# Patient Record
Sex: FEMALE | Race: WHITE | NOT HISPANIC OR LATINO | Employment: OTHER | URBAN - METROPOLITAN AREA
[De-identification: names, ages, dates, MRNs, and addresses within clinical notes are randomized per-mention and may not be internally consistent; named-entity substitution may affect disease eponyms.]

---

## 2017-02-02 RX ORDER — TIZANIDINE 4 MG/1
TABLET ORAL
Qty: 90 TABLET | Refills: 3 | Status: SHIPPED | OUTPATIENT
Start: 2017-02-02 | End: 2018-05-04 | Stop reason: ALTCHOICE

## 2017-02-14 ENCOUNTER — HOSPITAL ENCOUNTER (EMERGENCY)
Facility: HOSPITAL | Age: 67
Discharge: HOME | End: 2017-02-14
Attending: EMERGENCY MEDICINE
Payer: MEDICARE

## 2017-02-14 VITALS
WEIGHT: 140 LBS | HEIGHT: 65 IN | BODY MASS INDEX: 23.32 KG/M2 | DIASTOLIC BLOOD PRESSURE: 82 MMHG | HEART RATE: 95 BPM | SYSTOLIC BLOOD PRESSURE: 150 MMHG | OXYGEN SATURATION: 99 % | RESPIRATION RATE: 14 BRPM | TEMPERATURE: 96 F

## 2017-02-14 DIAGNOSIS — F19.20: ICD-10-CM

## 2017-02-14 DIAGNOSIS — R55 NEAR SYNCOPE: Primary | ICD-10-CM

## 2017-02-14 LAB
ALBUMIN SERPL BCP-MCNC: 3.9 G/DL
ALP SERPL-CCNC: 88 U/L
ALT SERPL W/O P-5'-P-CCNC: 27 U/L
ANION GAP SERPL CALC-SCNC: 15 MMOL/L
AST SERPL-CCNC: 31 U/L
BASOPHILS # BLD AUTO: 0.04 K/UL
BASOPHILS NFR BLD: 0.6 %
BILIRUB SERPL-MCNC: 0.8 MG/DL
BUN SERPL-MCNC: 15 MG/DL
CALCIUM SERPL-MCNC: 9.7 MG/DL
CHLORIDE SERPL-SCNC: 100 MMOL/L
CO2 SERPL-SCNC: 22 MMOL/L
CREAT SERPL-MCNC: 0.8 MG/DL
DIFFERENTIAL METHOD: ABNORMAL
EOSINOPHIL # BLD AUTO: 0.1 K/UL
EOSINOPHIL NFR BLD: 1.3 %
ERYTHROCYTE [DISTWIDTH] IN BLOOD BY AUTOMATED COUNT: 14.7 %
EST. GFR  (AFRICAN AMERICAN): >60 ML/MIN/1.73 M^2
EST. GFR  (NON AFRICAN AMERICAN): >60 ML/MIN/1.73 M^2
GLUCOSE SERPL-MCNC: 84 MG/DL
HCT VFR BLD AUTO: 33.8 %
HGB BLD-MCNC: 11 G/DL
LYMPHOCYTES # BLD AUTO: 1.9 K/UL
LYMPHOCYTES NFR BLD: 30.1 %
MCH RBC QN AUTO: 28.9 PG
MCHC RBC AUTO-ENTMCNC: 32.5 %
MCV RBC AUTO: 89 FL
MONOCYTES # BLD AUTO: 0.8 K/UL
MONOCYTES NFR BLD: 13.2 %
NEUTROPHILS # BLD AUTO: 3.5 K/UL
NEUTROPHILS NFR BLD: 54.6 %
PLATELET # BLD AUTO: 213 K/UL
PMV BLD AUTO: 10.7 FL
POTASSIUM SERPL-SCNC: 4.1 MMOL/L
PROT SERPL-MCNC: 6.6 G/DL
RBC # BLD AUTO: 3.8 M/UL
SODIUM SERPL-SCNC: 137 MMOL/L
WBC # BLD AUTO: 6.35 K/UL

## 2017-02-14 PROCEDURE — 93005 ELECTROCARDIOGRAM TRACING: CPT

## 2017-02-14 PROCEDURE — 25000003 PHARM REV CODE 250: Performed by: EMERGENCY MEDICINE

## 2017-02-14 PROCEDURE — 99284 EMERGENCY DEPT VISIT MOD MDM: CPT | Mod: 25

## 2017-02-14 PROCEDURE — 96360 HYDRATION IV INFUSION INIT: CPT

## 2017-02-14 PROCEDURE — 85025 COMPLETE CBC W/AUTO DIFF WBC: CPT

## 2017-02-14 PROCEDURE — 80053 COMPREHEN METABOLIC PANEL: CPT

## 2017-02-14 RX ADMIN — SODIUM CHLORIDE 500 ML: 0.9 INJECTION, SOLUTION INTRAVENOUS at 07:02

## 2017-02-14 NOTE — ED AVS SNAPSHOT
[facility] [address]  [place] [address]               Hillary [address]   2017  6:35 PM   ED    Description:  Female : 1950   Department:  [facility]           Your Care was Coordinated By:     Provider Role From To    Humza Romano MD Attending Provider 17 1421 --      Reason for Visit     Loss of Consciousness           Diagnoses this Visit        Comments    Near syncope    -  Primary     Polysubstance (excluding opioids) dependence w/o physiol dependence           ED Disposition     None           To Do List           Follow-up Information     Follow up with Sandy Small MD. Call in 1 day.    Specialty:  Internal Medicine    Why:  Call tomorrow to schedule follow-up appointment to make sure you doing better.  You should be seen within the next week.  Return to the emergency room for worsening symptoms    Contact information:     [facility]  [place] [address]  [phone]        [facility]     [facility] Care [facility] -  Assistance  Registered nurses in the [facility] provide clinical advisement, health education, appointment booking, and other advisory services.  Call for this free service at [phone].             Medications           Message regarding Medications     Verify the changes and/or additions to your medication regime listed below are the same as discussed with your clinician today.  If any of these changes or additions are incorrect, please notify your healthcare provider.        These medications were administered today        Dose Freq    sodium chloride 0.9% bolus 500 mL 500 mL ED 1 Time    Sig: Inject 500 mLs into the vein ED 1 Time.    Class: Normal    Route: Intravenous           Verify that the below list of medications is an accurate representation of the medications you are currently taking.  If none reported, the list may be blank. If incorrect, please  "contact your healthcare provider. Carry this list with you in case of emergency.           Current Medications     diazepam (VALIUM) 10 MG Tab     levothyroxine (SYNTHROID) 125 MCG tablet Take 125 mcg by mouth once daily.    tizanidine (ZANAFLEX) 4 MG tablet TAKE ONE TABLET BY MOUTH EVERY 8 HOURS IF NEEDED FOR MUSCLE SPASMS    zolpidem (AMBIEN) 10 mg Tab 5 mg.            Clinical Reference Information           Your Vitals Were     BP Pulse Temp Resp Height Weight    135/86 (Patient Position: Standing) 95 96.1 °F (35.6 °C) (Oral) 14 5' 5" (1.651 m) 63.5 kg (140 lb)    SpO2 BMI             99% 23.3 kg/m2         Allergies as of 2/14/2017     No Known Allergies      Immunizations Administered on Date of Encounter - 2/14/2017     None      ED Micro, Lab, POCT     Start Ordered       Status Ordering Provider    02/14/17 1913 02/14/17 1913  CBC auto differential  STAT      Final result     02/14/17 1913 02/14/17 1913  Comprehensive metabolic panel  STAT      Final result       ED Imaging Orders     None      Discharge References/Attachments     PRESCRIPTION MEDICINES, SIGNS OF ADDICTION TO (ENGLISH)    NEAR SYNCOPE, UNKNOWN (ENGLISH)    HYPOTENSION, ALL CAUSES (ENGLISH)    LOW BLOOD PRESSURE (HYPOTENSION), DISCHARGE INSTRUCTIONS (ENGLISH)      [facility] Sign-Up     Activating your [facility] account is as easy as 1-2-3!     1) Visit [email or web address], select Sign Up Now, enter this activation code and your date of birth, then select Next.  [email or web address]  Expires: 3/31/2017  8:35 PM      2) Create a username and password to use when you visit [facility] in the future and select a security question in case you lose your password and select Next.    3) Enter your e-mail address and click Sign Up!    Additional Information  If you have questions, please e-mail [email or web address] or call [phone] to talk to our [facility] staff. Remember, [facility] is NOT to be used for urgent needs. For medical emergencies, dial 911.    "       [facility] complies with applicable Federal civil rights laws and does not discriminate on the basis of race, color, national origin, age, disability, or sex.        Language Assistance Services     ATTENTION: Language assistance services are available, free of charge. Please call [phone].      ATENCIÓN: Si habla español, tiene a joaquin disposición servicios gratuitos de asistencia lingüística. Llame al [phone].     CHÚ Ý: N?u b?n nói Ti?ng Vi?t, có các d?ch v? h? tr? ngôn ng? mi?n phí dành cho b?n. G?i s? [phone].

## 2017-02-15 NOTE — ED NOTES
Pt reports LOC today while sitting on toilet, pt denies chest pain or sob, denies dizziness denies head trauma, pt awake alert in no acute distress, pt reports not sleeping for the past 3 nights,

## 2017-02-15 NOTE — ED PROVIDER NOTES
Encounter Date: 2/14/2017       History     Chief Complaint   Patient presents with    Loss of Consciousness     accomapnied by hypotension; EMS reports patients bp was in the 70's on scene; states was sitting on toilet when loc occurred; denies chest pain, shortness of breath or injury;      Review of patient's allergies indicates:  No Known Allergies  HPI Comments: Issue is a 66-year-old female with past medical history of depression, Raynaud phenomenon, hypothyroidism, prior ER visit in August of last year for hypertension who presents to emergency room for evaluation of an episode of hypotension while she was on the toilet using the bathroom.  She states she fell asleep and then her  started knocking on the door with EMS who noted her to have a systolic blood pressure 70s.  The patient denies any headache chest pain abdominal pain vomiting diarrhea dysuria fevers headache palpitations or recent infections or upper respiratory symptoms.  The patient does take several different medications to deal with her stress including tizanidine, Valium, zolpidem all of which she took today.  She usually takes a regular basis except for the tizanidine which she decided to take today because her  has been significantly stressing her out with his Alzheimer's.  Patient states her  stresses her out and she frequently loses sleep and doesn't need as much as she should.  She also had a glass of wine yesterday    Past Medical History   Diagnosis Date    Depression     History of venomous spider bite 2010    Hypertension      diet controlled    Hypothyroidism     Other and unspecified hyperlipidemia      Diet-controlled    Raynaud phenomenon      No past medical history pertinent negatives.  Past Surgical History   Procedure Laterality Date    Thyroid surgery      Fracture left humerus   December 2012     with hardware     History reviewed. No pertinent family history.  Social History   Substance Use  Topics    Smoking status: Never Smoker    Smokeless tobacco: Never Used    Alcohol use No     Review of Systems   Constitutional: Negative for fever.   HENT: Negative for ear pain, rhinorrhea and sore throat.    Eyes: Negative for pain and visual disturbance.   Respiratory: Negative for cough, shortness of breath, wheezing and stridor.    Cardiovascular: Negative for chest pain, palpitations and leg swelling.   Gastrointestinal: Negative for abdominal pain, diarrhea, nausea and vomiting.   Endocrine: Negative for polyuria.   Genitourinary: Negative for decreased urine volume, difficulty urinating, dysuria, hematuria, urgency and vaginal discharge.   Musculoskeletal: Negative for arthralgias.   Skin: Negative for rash.   Neurological: Positive for syncope (patient states she did not lose consciousness but just fell asleep). Negative for weakness, numbness and headaches.   Psychiatric/Behavioral: Positive for sleep disturbance (Chronic insomnia). Negative for agitation, confusion, self-injury and suicidal ideas. The patient is nervous/anxious.    All other systems reviewed and are negative.      Physical Exam   Initial Vitals   BP Pulse Resp Temp SpO2   02/14/17 1835 02/14/17 1835 02/14/17 1835 02/14/17 1835 02/14/17 1835   95/50 80 14 96.1 °F (35.6 °C) 97 %     Physical Exam    Nursing note and vitals reviewed.  Constitutional: She appears well-developed and well-nourished. No distress.   HENT:   Head: Normocephalic and atraumatic.   Mouth/Throat: Oropharynx is clear and moist.   Eyes: Conjunctivae and EOM are normal. Pupils are equal, round, and reactive to light.   Neck: Neck supple.   Cardiovascular: Normal rate, regular rhythm, normal heart sounds and intact distal pulses. Exam reveals no gallop and no friction rub.    No murmur heard.  Pulmonary/Chest: Breath sounds normal. She has no wheezes. She has no rhonchi. She has no rales.   Abdominal: Soft. Bowel sounds are normal. There is no tenderness. There is no  rebound and no guarding.   Musculoskeletal: Normal range of motion.   Neurological: She is alert and oriented to person, place, and time. She has normal strength. No sensory deficit.   Skin: Skin is warm and dry.   Psychiatric: Her behavior is normal. Judgment and thought content normal.   Slightly anxious, but not depressed or suicidal.         ED Course   Procedures  Labs Reviewed   CBC W/ AUTO DIFFERENTIAL - Abnormal; Notable for the following:        Result Value    RBC 3.80 (*)     Hemoglobin 11.0 (*)     Hematocrit 33.8 (*)     RDW 14.7 (*)     All other components within normal limits   COMPREHENSIVE METABOLIC PANEL - Abnormal; Notable for the following:     CO2 22 (*)     All other components within normal limits                               ED Course     Clinical Impression:   The primary encounter diagnosis was Near syncope. A diagnosis of Polysubstance (excluding opioids) dependence w/o physiol dependence was also pertinent to this visit.          Humza Romano MD  02/14/17 2052

## 2017-02-19 ENCOUNTER — HOSPITAL ENCOUNTER (EMERGENCY)
Facility: HOSPITAL | Age: 67
Discharge: PSYCHIATRIC OR SUBSTANCE USE FACILITY | End: 2017-02-20
Attending: EMERGENCY MEDICINE
Payer: MEDICARE

## 2017-02-19 DIAGNOSIS — F22 PARANOID: ICD-10-CM

## 2017-02-19 DIAGNOSIS — F29 PSYCHOSIS, UNSPECIFIED PSYCHOSIS TYPE: ICD-10-CM

## 2017-02-19 DIAGNOSIS — F22 PARANOID DELUSION: Primary | ICD-10-CM

## 2017-02-19 LAB
ALBUMIN SERPL BCP-MCNC: 3.8 G/DL
ALP SERPL-CCNC: 97 U/L
ALT SERPL W/O P-5'-P-CCNC: 29 U/L
ANION GAP SERPL CALC-SCNC: 18 MMOL/L
APAP SERPL-MCNC: <3 UG/ML
AST SERPL-CCNC: 57 U/L
BASOPHILS # BLD AUTO: 0.04 K/UL
BASOPHILS NFR BLD: 0.6 %
BILIRUB SERPL-MCNC: 0.9 MG/DL
BUN SERPL-MCNC: 21 MG/DL
CALCIUM SERPL-MCNC: 9.5 MG/DL
CHLORIDE SERPL-SCNC: 96 MMOL/L
CO2 SERPL-SCNC: 24 MMOL/L
CREAT SERPL-MCNC: 1.1 MG/DL
DIFFERENTIAL METHOD: ABNORMAL
EOSINOPHIL # BLD AUTO: 0.1 K/UL
EOSINOPHIL NFR BLD: 0.9 %
ERYTHROCYTE [DISTWIDTH] IN BLOOD BY AUTOMATED COUNT: 14.7 %
EST. GFR  (AFRICAN AMERICAN): >60 ML/MIN/1.73 M^2
EST. GFR  (NON AFRICAN AMERICAN): 52 ML/MIN/1.73 M^2
ETHANOL SERPL-MCNC: <10 MG/DL
GLUCOSE SERPL-MCNC: 91 MG/DL
HCT VFR BLD AUTO: 37.9 %
HGB BLD-MCNC: 12.3 G/DL
LYMPHOCYTES # BLD AUTO: 1.8 K/UL
LYMPHOCYTES NFR BLD: 26.8 %
MCH RBC QN AUTO: 29.2 PG
MCHC RBC AUTO-ENTMCNC: 32.5 %
MCV RBC AUTO: 90 FL
MONOCYTES # BLD AUTO: 0.5 K/UL
MONOCYTES NFR BLD: 7.2 %
NEUTROPHILS # BLD AUTO: 4.2 K/UL
NEUTROPHILS NFR BLD: 64.3 %
PLATELET # BLD AUTO: 241 K/UL
PMV BLD AUTO: 11 FL
POTASSIUM SERPL-SCNC: 3.4 MMOL/L
PROT SERPL-MCNC: 6.5 G/DL
RBC # BLD AUTO: 4.21 M/UL
SALICYLATES SERPL-MCNC: <5 MG/DL
SODIUM SERPL-SCNC: 138 MMOL/L
TSH SERPL DL<=0.005 MIU/L-ACNC: 0.41 UIU/ML
WBC # BLD AUTO: 6.57 K/UL

## 2017-02-19 PROCEDURE — 63600175 PHARM REV CODE 636 W HCPCS: Performed by: EMERGENCY MEDICINE

## 2017-02-19 PROCEDURE — 99285 EMERGENCY DEPT VISIT HI MDM: CPT | Mod: 25

## 2017-02-19 PROCEDURE — 80053 COMPREHEN METABOLIC PANEL: CPT

## 2017-02-19 PROCEDURE — 80307 DRUG TEST PRSMV CHEM ANLYZR: CPT

## 2017-02-19 PROCEDURE — 84443 ASSAY THYROID STIM HORMONE: CPT

## 2017-02-19 PROCEDURE — 80329 ANALGESICS NON-OPIOID 1 OR 2: CPT

## 2017-02-19 PROCEDURE — 25000003 PHARM REV CODE 250: Performed by: EMERGENCY MEDICINE

## 2017-02-19 PROCEDURE — 96361 HYDRATE IV INFUSION ADD-ON: CPT

## 2017-02-19 PROCEDURE — 85025 COMPLETE CBC W/AUTO DIFF WBC: CPT

## 2017-02-19 PROCEDURE — 96374 THER/PROPH/DIAG INJ IV PUSH: CPT

## 2017-02-19 PROCEDURE — 80320 DRUG SCREEN QUANTALCOHOLS: CPT

## 2017-02-19 PROCEDURE — 96375 TX/PRO/DX INJ NEW DRUG ADDON: CPT

## 2017-02-19 RX ORDER — SODIUM CHLORIDE 9 MG/ML
1000 INJECTION, SOLUTION INTRAVENOUS
Status: COMPLETED | OUTPATIENT
Start: 2017-02-19 | End: 2017-02-19

## 2017-02-19 RX ORDER — HALOPERIDOL 5 MG/ML
5 INJECTION INTRAMUSCULAR
Status: COMPLETED | OUTPATIENT
Start: 2017-02-19 | End: 2017-02-19

## 2017-02-19 RX ORDER — LORAZEPAM 2 MG/ML
2 INJECTION INTRAMUSCULAR
Status: COMPLETED | OUTPATIENT
Start: 2017-02-19 | End: 2017-02-19

## 2017-02-19 RX ADMIN — SODIUM CHLORIDE 1000 ML: 0.9 INJECTION, SOLUTION INTRAVENOUS at 11:02

## 2017-02-19 RX ADMIN — HALOPERIDOL LACTATE 5 MG: 5 INJECTION, SOLUTION INTRAMUSCULAR at 11:02

## 2017-02-19 RX ADMIN — LORAZEPAM 2 MG: 2 INJECTION, SOLUTION INTRAMUSCULAR; INTRAVENOUS at 11:02

## 2017-02-20 VITALS
OXYGEN SATURATION: 99 % | BODY MASS INDEX: 24.66 KG/M2 | TEMPERATURE: 98 F | HEIGHT: 65 IN | RESPIRATION RATE: 18 BRPM | DIASTOLIC BLOOD PRESSURE: 68 MMHG | WEIGHT: 148 LBS | HEART RATE: 99 BPM | SYSTOLIC BLOOD PRESSURE: 120 MMHG

## 2017-02-20 LAB
AMPHET+METHAMPHET UR QL: NEGATIVE
BARBITURATES UR QL SCN>200 NG/ML: NEGATIVE
BENZODIAZ UR QL SCN>200 NG/ML: NORMAL
BILIRUB UR QL STRIP: NEGATIVE
BZE UR QL SCN: NEGATIVE
CANNABINOIDS UR QL SCN: NEGATIVE
CLARITY UR: CLEAR
COLOR UR: YELLOW
CREAT UR-MCNC: 56.1 MG/DL
GLUCOSE UR QL STRIP: NEGATIVE
HGB UR QL STRIP: NEGATIVE
KETONES UR QL STRIP: ABNORMAL
LEUKOCYTE ESTERASE UR QL STRIP: NEGATIVE
METHADONE UR QL SCN>300 NG/ML: NEGATIVE
NITRITE UR QL STRIP: NEGATIVE
OPIATES UR QL SCN: NEGATIVE
PCP UR QL SCN>25 NG/ML: NEGATIVE
PH UR STRIP: 6 [PH] (ref 5–8)
PROT UR QL STRIP: NEGATIVE
SP GR UR STRIP: <=1.005 (ref 1–1.03)
TOXICOLOGY INFORMATION: NORMAL
URN SPEC COLLECT METH UR: ABNORMAL
UROBILINOGEN UR STRIP-ACNC: NEGATIVE EU/DL

## 2017-02-20 PROCEDURE — 93010 ELECTROCARDIOGRAM REPORT: CPT | Mod: ,,, | Performed by: INTERNAL MEDICINE

## 2017-02-20 PROCEDURE — 93005 ELECTROCARDIOGRAM TRACING: CPT

## 2017-02-20 PROCEDURE — 81003 URINALYSIS AUTO W/O SCOPE: CPT

## 2017-02-20 PROCEDURE — 82570 ASSAY OF URINE CREATININE: CPT

## 2017-02-20 PROCEDURE — 25000003 PHARM REV CODE 250: Performed by: PSYCHIATRY & NEUROLOGY

## 2017-02-20 RX ORDER — DIVALPROEX SODIUM 250 MG/1
250 TABLET, DELAYED RELEASE ORAL DAILY
Status: DISCONTINUED | OUTPATIENT
Start: 2017-02-20 | End: 2017-02-20 | Stop reason: HOSPADM

## 2017-02-20 RX ORDER — RISPERIDONE 0.5 MG/1
0.5 TABLET ORAL 2 TIMES DAILY
Status: DISCONTINUED | OUTPATIENT
Start: 2017-02-20 | End: 2017-02-20 | Stop reason: HOSPADM

## 2017-02-20 RX ADMIN — DIVALPROEX SODIUM 250 MG: 250 TABLET, DELAYED RELEASE ORAL at 09:02

## 2017-02-20 RX ADMIN — RISPERIDONE 0.5 MG: 0.5 TABLET ORAL at 09:02

## 2017-02-20 NOTE — ED PROVIDER NOTES
Encounter Date: 2/19/2017       History     Chief Complaint   Patient presents with    Hallucinations     pt. was found by [facility] knocking on house doors telling people that her  was trying to kill her. she is anxious and rambling on arrival. thoughts are incoherent.      Review of patient's allergies indicates:  No Known Allergies  HPI  Past Medical History   Diagnosis Date    Depression     History of venomous spider bite 2010    Hypertension      diet controlled    Hypothyroidism     Other and unspecified hyperlipidemia      Diet-controlled    Raynaud phenomenon      No past medical history pertinent negatives.  Past Surgical History   Procedure Laterality Date    Thyroid surgery      Fracture left humerus   December 2012     with hardware     No family history on file.  Social History   Substance Use Topics    Smoking status: Never Smoker    Smokeless tobacco: Never Used    Alcohol use No     Review of Systems    Physical Exam   Initial Vitals   BP Pulse Resp Temp SpO2   02/19/17 2200 02/19/17 2200 02/19/17 2200 02/19/17 2200 02/19/17 2200   98/58 94 20 98.4 °F (36.9 °C) 99 %     Physical Exam    ED Course   Procedures  Labs Reviewed   CBC W/ AUTO DIFFERENTIAL - Abnormal; Notable for the following:        Result Value    RDW 14.7 (*)     All other components within normal limits   COMPREHENSIVE METABOLIC PANEL - Abnormal; Notable for the following:     Potassium 3.4 (*)     AST 57 (*)     Anion Gap 18 (*)     eGFR if non  52 (*)     All other components within normal limits   URINALYSIS - Abnormal; Notable for the following:     Specific Gravity, UA <=1.005 (*)     Ketones, UA 3+ (*)     All other components within normal limits   ACETAMINOPHEN LEVEL - Abnormal; Notable for the following:     Acetaminophen (Tylenol), Serum <3.0 (*)     All other components within normal limits   SALICYLATE LEVEL - Abnormal; Notable for the following:     Salicylate Lvl <5.0 (*)     All other  components within normal limits   TSH   ALCOHOL,MEDICAL (ETHANOL)   DRUG SCREEN PANEL, URINE EMERGENCY                          Attending Attestation:             Attending ED Notes:   Patient is medically cleared and awaiting psychiatry consultation.          ED Course     Clinical Impression:   {Add your Clinical Impression here. If you haven't documented one yet, please pend the note, finalize a Clinical Impression, and refresh your note before signing.:[id number]}

## 2017-02-20 NOTE — ED NOTES
Report received. Care assumed. Pt AAOx4. Respirations even and unlabored. Pt VSS. Will continue to monitor.
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
## 2017-02-20 NOTE — ED NOTES
Report received from kushal macdonald patient awake alert risk sitter at bedside. Vital signs stable patient resting quietly side rails up. Nurse will continue to monitor

## 2017-02-20 NOTE — ED PROVIDER NOTES
Encounter Date: 2/19/2017       History     Chief Complaint   Patient presents with    Hallucinations     pt. was found by [facility] knocking on house doors telling people that her  was trying to kill her. she is anxious and rambling on arrival. thoughts are incoherent.      Review of patient's allergies indicates:  No Known Allergies  HPI Comments: This is a 65 y/o F who was found by 's office randomly knocking on neighbor's doors telling them her  was going to murder her.  She appears psychotic and paranoid with pressured speech and flight of ideas, believes there is a conspiracy against her and nobody believes her.  Patient is hyperreligious and hypersexual.  Reports she has not slept for several days.  Minimal oral fluids, very poor intake for the past few days. Patient denies suicidal or homicidal ideations.     Past Medical History   Diagnosis Date    Depression     History of venomous spider bite 2010    Hypertension      diet controlled    Hypothyroidism     Other and unspecified hyperlipidemia      Diet-controlled    Raynaud phenomenon      No past medical history pertinent negatives.  Past Surgical History   Procedure Laterality Date    Thyroid surgery      Fracture left humerus   December 2012     with hardware     No family history on file.  Social History   Substance Use Topics    Smoking status: Never Smoker    Smokeless tobacco: Never Used    Alcohol use No     Review of Systems   Constitutional: Negative for fever.   HENT: Negative for sore throat.    Respiratory: Negative for shortness of breath.    Cardiovascular: Negative for chest pain.   Gastrointestinal: Negative for nausea.   Genitourinary: Negative for dysuria.   Musculoskeletal: Negative for back pain.   Skin: Negative for rash.   Neurological: Negative for weakness.   Hematological: Does not bruise/bleed easily.   Psychiatric/Behavioral: Positive for agitation. Negative for self-injury and suicidal ideas. The  patient is nervous/anxious.    All other systems reviewed and are negative.      Physical Exam   Initial Vitals   BP Pulse Resp Temp SpO2   02/19/17 2200 02/19/17 2200 02/19/17 2200 02/19/17 2200 02/19/17 2200   98/58 94 20 98.4 °F (36.9 °C) 99 %     Physical Exam    Nursing note and vitals reviewed.  Constitutional: She appears well-developed and well-nourished.   HENT:   Head: Normocephalic and atraumatic.   Dry mucosa    Eyes: Conjunctivae and EOM are normal. Pupils are equal, round, and reactive to light. Right eye exhibits no discharge. Left eye exhibits no discharge. No scleral icterus.   Neck: Normal range of motion. Neck supple.   Cardiovascular: Normal rate, regular rhythm and normal heart sounds. Exam reveals no gallop and no friction rub.    No murmur heard.  Pulmonary/Chest: Breath sounds normal. No stridor. No respiratory distress. She has no wheezes. She has no rhonchi. She has no rales.   Abdominal: Soft. Bowel sounds are normal. She exhibits no distension and no mass. There is no tenderness. There is no rebound and no guarding.   Neurological: She is alert and oriented to person, place, and time. She has normal strength. No cranial nerve deficit or sensory deficit.   Skin: Skin is warm and dry.   Psychiatric: Her mood appears anxious. Her affect is inappropriate. Her speech is rapid and/or pressured and tangential. She is agitated. She is not actively hallucinating. Thought content is paranoid and delusional. Cognition and memory are not impaired. She expresses impulsivity and inappropriate judgment. She expresses no homicidal and no suicidal ideation. She expresses no suicidal plans and no homicidal plans. She exhibits normal recent memory and normal remote memory. She is attentive.         ED Course   Procedures  Labs Reviewed   CBC W/ AUTO DIFFERENTIAL - Abnormal; Notable for the following:        Result Value    RDW 14.7 (*)     All other components within normal limits   COMPREHENSIVE METABOLIC  PANEL - Abnormal; Notable for the following:     Potassium 3.4 (*)     AST 57 (*)     Anion Gap 18 (*)     eGFR if non  52 (*)     All other components within normal limits   ACETAMINOPHEN LEVEL - Abnormal; Notable for the following:     Acetaminophen (Tylenol), Serum <3.0 (*)     All other components within normal limits   SALICYLATE LEVEL - Abnormal; Notable for the following:     Salicylate Lvl <5.0 (*)     All other components within normal limits   TSH   ALCOHOL,MEDICAL (ETHANOL)   URINALYSIS   DRUG SCREEN PANEL, URINE EMERGENCY             Medical Decision Making:   Initial Assessment:   Patient appears paranoid and delusional.  Apparently this is not her first such episode.  Will medically clear, hydrate and PEC for psychiatric placement.   Differential Diagnosis:   Psychosis, electrolyte abnormality, dehydration, medication side effect                    ED Course     Clinical Impression:   The primary encounter diagnosis was Paranoid delusion. A diagnosis of Psychosis, unspecified psychosis type was also pertinent to this visit.    Disposition:   Disposition: Transferred  Condition: Stable       Pamela Starr MD  02/20/17 0154

## 2017-02-20 NOTE — ED NOTES
Comfort measures: pillow and blanket given. Lights turned out. Call light and water at bedside. Pt. Remains un melvi direct visualization.
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
## 2017-02-20 NOTE — ED NOTES
Pt. Is awake, calmer this morning, still paranoid. Pt. Is asking for phone book. Pt. Is ambulatory to bathroom with sitter. Bedside report with kushal macdonald.

## 2017-02-20 NOTE — ED NOTES
Call to community care regarding placement, no beds presently. Info. Faxed to 8 facilities, awaiting return call.

## 2017-02-20 NOTE — ED NOTES
Pt. Ambulatory to bathroom with sitter. Pt. Reports she missed urine specimen cup  While urinating. Pt. Given toothbrush/toothpaste and  at bedside sink brushing her teeth.

## 2018-01-22 RX ORDER — TIZANIDINE 4 MG/1
TABLET ORAL
Qty: 90 TABLET | OUTPATIENT
Start: 2018-01-22

## 2018-05-04 ENCOUNTER — OFFICE VISIT (OUTPATIENT)
Dept: URGENT CARE | Facility: CLINIC | Age: 68
End: 2018-05-04
Payer: MEDICARE

## 2018-05-04 VITALS
HEART RATE: 110 BPM | WEIGHT: 155 LBS | OXYGEN SATURATION: 98 % | BODY MASS INDEX: 25.83 KG/M2 | TEMPERATURE: 98 F | HEIGHT: 65 IN | DIASTOLIC BLOOD PRESSURE: 97 MMHG | SYSTOLIC BLOOD PRESSURE: 170 MMHG | RESPIRATION RATE: 18 BRPM

## 2018-05-04 DIAGNOSIS — R35.0 URINARY FREQUENCY: ICD-10-CM

## 2018-05-04 DIAGNOSIS — N39.0 URINARY TRACT INFECTION WITHOUT HEMATURIA, SITE UNSPECIFIED: Primary | ICD-10-CM

## 2018-05-04 DIAGNOSIS — R30.0 DYSURIA: ICD-10-CM

## 2018-05-04 LAB
BILIRUB UR QL STRIP: NEGATIVE
GLUCOSE UR QL STRIP: NEGATIVE
KETONES UR QL STRIP: NEGATIVE
LEUKOCYTE ESTERASE UR QL STRIP: NEGATIVE
PH, POC UA: 5 (ref 5–8)
POC BLOOD, URINE: NEGATIVE
POC NITRATES, URINE: NEGATIVE
PROT UR QL STRIP: NEGATIVE
SP GR UR STRIP: 1.02 (ref 1–1.03)
UROBILINOGEN UR STRIP-ACNC: NORMAL (ref 0.1–1.1)

## 2018-05-04 PROCEDURE — 81003 URINALYSIS AUTO W/O SCOPE: CPT | Mod: QW,S$GLB,, | Performed by: FAMILY MEDICINE

## 2018-05-04 PROCEDURE — 3077F SYST BP >= 140 MM HG: CPT | Mod: CPTII,S$GLB,, | Performed by: FAMILY MEDICINE

## 2018-05-04 PROCEDURE — 99203 OFFICE O/P NEW LOW 30 MIN: CPT | Mod: S$GLB,,, | Performed by: FAMILY MEDICINE

## 2018-05-04 PROCEDURE — 3080F DIAST BP >= 90 MM HG: CPT | Mod: CPTII,S$GLB,, | Performed by: FAMILY MEDICINE

## 2018-05-04 RX ORDER — LOSARTAN POTASSIUM 25 MG/1
25 TABLET ORAL DAILY
COMMUNITY
End: 2018-07-19

## 2018-05-04 RX ORDER — SULFAMETHOXAZOLE AND TRIMETHOPRIM 800; 160 MG/1; MG/1
1 TABLET ORAL 2 TIMES DAILY
Qty: 10 TABLET | Refills: 0 | Status: SHIPPED | OUTPATIENT
Start: 2018-05-04 | End: 2018-05-09

## 2018-05-04 NOTE — PATIENT INSTRUCTIONS
"  Dysuria     Painful urination (dysuria) is often caused by a problem in the urinary tract.   Dysuria is pain felt during urination. It is often described as a burning. Learn more about this problem and how it can be treated.  What causes dysuria?  Possible causes include:  · Infection with a bacteria or virus such as a urinary tract infection (UTI or a sexually transmitted infection (STI)  · Sensitivity or allergy to chemicals such as those found in lotions and other products  · Prostate or bladder problems  · Radiation therapy to the pelvic area  How is dysuria diagnosed?  Your healthcare provider will examine you. He or she will ask about your symptoms and health. After talking with you and doing a physical exam, your healthcare provider may know what is causing your dysuria. He or she will usually request  a sample of your urine. Tests of your urine, or a "urinalysis," are done. A urinalysis may include:  · Looking at the urine sample (visual exam)  · Checking for substances (chemical exam)  · Looking at a small amount under a microscope (microscopic exam)  Some parts of the urinalysis may be done in the provider's office and some in a lab. And, the urine sample may be checked for bacteria and yeast (urine culture). Your healthcare provider will tell you more about these tests if they are needed.  How is dysuria treated?  Treatment depends on the cause. If you have a bacterial infection, you may need antibiotics. You may be given medicines to make it easier for you to urinate and help relieve pain. Your healthcare provider can tell you more about your treatment options. Untreated, symptoms may get worse.  When to call your healthcare provider  Call the healthcare provider right away if you have any of the following:  · Fever of 100.4°F (38°C) or higher   · No improvement after three days of treatment  · Trouble urinating because of pain  · New or increased discharge from the vagina or penis  · Rash or joint " pain  · Increased back or abdominal pain  · Enlarged painful lymph nodes (lumps) in the groin   Date Last Reviewed: 1/1/2017  © 7856-0759 [employer]. [address] [place] [address], [place] [address]. All rights reserved. This information is not intended as a substitute for professional medical care. Always follow your healthcare professional's instructions.    Follow up with your doctor in a few days as needed.  Return to the urgent care or go to the ER if symptoms get worse.    Darnell Thorpe MD

## 2018-05-04 NOTE — PROGRESS NOTES
"Subjective:       Patient ID: Hillary Lane is a 68 y.o. female.    Vitals:  height is 5' 5" (1.651 m) and weight is 70.3 kg (155 lb). Her oral temperature is 98.1 °F (36.7 °C). Her blood pressure is 170/97 (abnormal) and her pulse is 110. Her respiration is 18 and oxygen saturation is 98%.     Chief Complaint: Urinary Tract Infection    Urinary Tract Infection    This is a new problem. The current episode started yesterday. The problem has been gradually worsening. The quality of the pain is described as burning. The pain is at a severity of 5/10. The pain is moderate. There has been no fever. She is not sexually active. There is no history of pyelonephritis. Associated symptoms include flank pain and urgency. Pertinent negatives include no chills, hematuria, nausea or vomiting. She has tried nothing for the symptoms. The treatment provided no relief.     Review of Systems   Constitution: Negative for chills and fever.   Skin: Negative for itching.   Musculoskeletal: Positive for back pain and myalgias.   Gastrointestinal: Negative for abdominal pain, nausea and vomiting.   Genitourinary: Positive for dysuria, flank pain and urgency. Negative for genital sores, hematuria, missed menses and non-menstrual bleeding.       Objective:      Physical Exam   Constitutional: She is oriented to person, place, and time. She appears well-developed and well-nourished.   HENT:   Head: Normocephalic and atraumatic.   Eyes: EOM are normal. Pupils are equal, round, and reactive to light.   Neck: Normal range of motion. Neck supple. No JVD present. No tracheal deviation present. No thyromegaly present.   Cardiovascular: Normal rate, regular rhythm and normal heart sounds.  Exam reveals no gallop and no friction rub.    No murmur heard.  Pulmonary/Chest: Breath sounds normal. No respiratory distress. She has no wheezes. She has no rales. She exhibits no tenderness.   Abdominal: Soft. Bowel sounds are normal. She exhibits no " distension and no mass. There is no tenderness. There is no rebound and no guarding. No hernia.   Genitourinary:   Genitourinary Comments:   Bladder pressure discomfort.  No cva tenderness.   Musculoskeletal: Normal range of motion. She exhibits no edema, tenderness or deformity.   Lymphadenopathy:     She has no cervical adenopathy.   Neurological: She is alert and oriented to person, place, and time. She displays normal reflexes. No cranial nerve deficit. She exhibits normal muscle tone. Coordination normal.   Skin: Skin is warm. Capillary refill takes less than 2 seconds. No rash noted. No erythema. No pallor.   Psychiatric: She has a normal mood and affect. Her behavior is normal. Judgment and thought content normal.   Vitals reviewed.      Assessment:       1. Dysuria    2. Urinary frequency        Plan:         Dysuria  -     POCT Urinalysis, Dipstick, Automated, W/O Scope  -     sulfamethoxazole-trimethoprim 800-160mg (BACTRIM DS) 800-160 mg Tab; Take 1 tablet by mouth 2 (two) times daily.  Dispense: 10 tablet; Refill: 0  -     Urine culture    Urinary frequency      Follow up with your doctor in a few days as needed.  Return to the urgent care or go to the ER if symptoms get worse.    Darnell Thorpe MD

## 2018-05-10 LAB
BACTERIA UR CULT: ABNORMAL
BACTERIA UR CULT: ABNORMAL
OTHER ANTIBIOTIC SUSC ISLT: ABNORMAL

## 2018-05-10 RX ORDER — NITROFURANTOIN 25; 75 MG/1; MG/1
100 CAPSULE ORAL 2 TIMES DAILY
Qty: 14 CAPSULE | Refills: 0 | Status: SHIPPED | OUTPATIENT
Start: 2018-05-10 | End: 2018-05-17

## 2018-07-02 RX ORDER — TIZANIDINE 4 MG/1
TABLET ORAL
Qty: 90 TABLET | OUTPATIENT
Start: 2018-07-02

## 2018-07-07 ENCOUNTER — HOSPITAL ENCOUNTER (EMERGENCY)
Facility: HOSPITAL | Age: 68
Discharge: HOME | End: 2018-07-07
Attending: EMERGENCY MEDICINE
Payer: MEDICARE

## 2018-07-07 VITALS
HEIGHT: 65 IN | DIASTOLIC BLOOD PRESSURE: 96 MMHG | RESPIRATION RATE: 18 BRPM | OXYGEN SATURATION: 97 % | BODY MASS INDEX: 24.99 KG/M2 | WEIGHT: 150 LBS | TEMPERATURE: 98 F | HEART RATE: 94 BPM | SYSTOLIC BLOOD PRESSURE: 173 MMHG

## 2018-07-07 DIAGNOSIS — K02.9 DENTAL CARIES: Primary | ICD-10-CM

## 2018-07-07 PROCEDURE — 99283 EMERGENCY DEPT VISIT LOW MDM: CPT

## 2018-07-07 RX ORDER — PENICILLIN V POTASSIUM 500 MG/1
500 TABLET, FILM COATED ORAL 2 TIMES DAILY
Qty: 14 TABLET | Refills: 0 | Status: SHIPPED | OUTPATIENT
Start: 2018-07-07 | End: 2018-07-14

## 2018-07-07 RX ORDER — HYDROCODONE BITARTRATE AND ACETAMINOPHEN 5; 325 MG/1; MG/1
1 TABLET ORAL EVERY 4 HOURS PRN
Qty: 10 TABLET | Refills: 0 | Status: SHIPPED | OUTPATIENT
Start: 2018-07-07 | End: 2018-07-19

## 2018-07-07 RX ORDER — NAPROXEN 500 MG/1
500 TABLET ORAL 2 TIMES DAILY WITH MEALS
Qty: 30 TABLET | Refills: 0 | Status: SHIPPED | OUTPATIENT
Start: 2018-07-07 | End: 2018-07-19

## 2018-07-07 RX ORDER — LIDOCAINE HYDROCHLORIDE 20 MG/ML
JELLY TOPICAL
Qty: 50 ML | Refills: 0 | Status: SHIPPED | OUTPATIENT
Start: 2018-07-07 | End: 2018-07-19

## 2018-07-08 NOTE — ED NOTES
"C/o loose tooth to R lower jaw starting Thursday, pain worsening Friday. Pt has hx of "tooth problems" denies any fever/chills, pt unable to tolerate solid food.   "

## 2018-07-08 NOTE — ED NOTES
AVS reviewed. Rx given as ordered. DC home with plan to follow up with dentist. Pt aware of plan. NAD noted. DC home with all belongings. Gait steady.

## 2018-07-08 NOTE — ED NOTES
APPEARANCE: Alert, oriented and in no acute distress.  CARDIAC: Normal rate and rhythm, no murmur heard.   PERIPHERAL VASCULAR: peripheral pulses present. Normal cap refill. No edema. Warm to touch.    RESPIRATORY:Normal rate and effort, breath sounds clear bilaterally throughout chest. Respirations are equal and unlabored no obvious signs of distress.  GASTRO: soft, bowel sounds normal, no tenderness, no abdominal distention.  MUSC: Full ROM. No bony tenderness or soft tissue tenderness. No obvious deformity.  SKIN: Skin is warm and dry, normal skin turgor, mucous membranes moist.  NEURO: 5/5 strength major flexors/extensors bilaterally. Sensory intact to light touch bilaterally. [place] coma scale: eyes open spontaneously-4, oriented & converses-5, obeys commands-6. No neurological abnormalities.   MENTAL STATUS: awake, alert and aware of environment.  EYE: PERRL, both eyes: pupils brisk and reactive to light. Normal size.  ENT: EARS: no obvious drainage. NOSE: no active bleeding.   khang. Tooth pain to R lower jaw starting Thursday.

## 2018-07-08 NOTE — ED PROVIDER NOTES
Encounter Date: 7/7/2018    SCRIBE #1 NOTE: I, Mickie Hill, am scribing for, and in the presence of,  Dr. Alvarez. I have scribed the entire note.       History     Chief Complaint   Patient presents with    Dental Pain     complaining of right sided tooth pain     Hillary Lane is a 68 y.o. female who  has a past medical history of Depression; History of venomous spider bite (2010); Hypertension; Hypothyroidism; Other and unspecified hyperlipidemia; and Raynaud phenomenon.    The patient presents to the ED due to right lower dental pain. She reports onset of symptoms was about 2 day ago. The patient notes she has a loose tooth. She suspects she bit on something that loosened the tooth. The patient reports since then she has been having severe dental pain. She describes the pain as sharp. The patient denies any associated gum swelling, facial swelling, nausea, vomiting, fever or chills. She denies use of any medication for the symptoms       The history is provided by the patient.     Review of patient's allergies indicates:  No Known Allergies  Past Medical History:   Diagnosis Date    Depression     History of venomous spider bite 2010    Hypertension     diet controlled    Hypothyroidism     Other and unspecified hyperlipidemia     Diet-controlled    Raynaud phenomenon      Past Surgical History:   Procedure Laterality Date    Fracture left humerus   December 2012    with hardware    THYROID SURGERY       No family history on file.  Social History   Substance Use Topics    Smoking status: Never Smoker    Smokeless tobacco: Never Used    Alcohol use No     Review of Systems   Constitutional: Negative for chills and fever.   HENT: Positive for dental problem. Negative for congestion, rhinorrhea and sore throat.    Eyes: Negative for redness and visual disturbance.   Respiratory: Negative for cough, shortness of breath and wheezing.    Cardiovascular: Negative for chest pain and palpitations.    Gastrointestinal: Negative for abdominal pain, diarrhea, nausea and vomiting.   Genitourinary: Negative for dysuria and hematuria.   Musculoskeletal: Negative for back pain, myalgias and neck pain.   Skin: Negative for rash.   Neurological: Negative for dizziness, weakness and light-headedness.   Psychiatric/Behavioral: Negative for confusion.       Physical Exam     Initial Vitals [07/07/18 2029]   BP Pulse Resp Temp SpO2   (!) 173/96 94 18 98.1 °F (36.7 °C) 97 %      MAP       --         Physical Exam    Nursing note and vitals reviewed.  Constitutional: She appears well-developed and well-nourished. She is not diaphoretic. No distress.   HENT:   Head: Normocephalic and atraumatic.   Mouth/Throat: Oropharynx is clear and moist.   Extensive dental caries with loose tooth of right lower jaw   Eyes: Conjunctivae and EOM are normal.   Neck: Normal range of motion. Neck supple.   Cardiovascular: Intact distal pulses.   Pulmonary/Chest: No respiratory distress.   Musculoskeletal: Normal range of motion. She exhibits no edema or tenderness.   Lymphadenopathy:     She has no cervical adenopathy.   Neurological: She is alert and oriented to person, place, and time. She has normal strength.   Skin: Skin is warm and dry. No rash noted.         ED Course   Procedures  Labs Reviewed - No data to display       Imaging Results    None          Medical Decision Making:   ED Management:  Pt to f/u w dental on monday              Attending Attestation:           Physician Attestation for Scribe:  Physician Attestation Statement for Scribe #1: I, El Alvarez, reviewed documentation, as scribed by Mickie Hill in my presence, and it is both accurate and complete.                    Clinical Impression:     1. Dental caries        Disposition:   Disposition: Discharged  Condition: Stable                        El Alvarez MD  07/07/18 0726

## 2018-07-19 ENCOUNTER — OFFICE VISIT (OUTPATIENT)
Dept: URGENT CARE | Facility: CLINIC | Age: 68
End: 2018-07-19
Payer: MEDICARE

## 2018-07-19 VITALS
SYSTOLIC BLOOD PRESSURE: 157 MMHG | RESPIRATION RATE: 18 BRPM | DIASTOLIC BLOOD PRESSURE: 90 MMHG | HEART RATE: 95 BPM | HEIGHT: 65 IN | TEMPERATURE: 97 F | WEIGHT: 158 LBS | OXYGEN SATURATION: 98 % | BODY MASS INDEX: 26.33 KG/M2

## 2018-07-19 DIAGNOSIS — D22.9 BENIGN PIGMENTED MOLE: Primary | ICD-10-CM

## 2018-07-19 PROCEDURE — 3080F DIAST BP >= 90 MM HG: CPT | Mod: CPTII,S$GLB,, | Performed by: INTERNAL MEDICINE

## 2018-07-19 PROCEDURE — 3077F SYST BP >= 140 MM HG: CPT | Mod: CPTII,S$GLB,, | Performed by: INTERNAL MEDICINE

## 2018-07-19 PROCEDURE — 99213 OFFICE O/P EST LOW 20 MIN: CPT | Mod: S$GLB,,, | Performed by: INTERNAL MEDICINE

## 2018-07-19 NOTE — PROGRESS NOTES
"Subjective:       Patient ID: Hillary Lane is a 68 y.o. female.    Vitals:  height is 5' 5" (1.651 m) and weight is 71.7 kg (158 lb). Her oral temperature is 97.4 °F (36.3 °C). Her blood pressure is 157/90 (abnormal) and her pulse is 95. Her respiration is 18 and oxygen saturation is 98%.     Chief Complaint: Mole    Mole   This is a new problem. The current episode started more than 1 year ago. The problem occurs constantly. The problem has been gradually worsening. Pertinent negatives include no abdominal pain, anorexia, arthralgias, change in bowel habit, chest pain, chills, congestion, coughing, diaphoresis, fatigue, fever, headaches, joint swelling, myalgias, nausea, neck pain, numbness, rash, sore throat, swollen glands, urinary symptoms, vertigo, visual change, vomiting or weakness. Nothing aggravates the symptoms. She has tried nothing for the symptoms. The treatment provided no relief.     Review of Systems   Constitution: Negative for chills, diaphoresis, fatigue, fever and weakness.   HENT: Negative for congestion and sore throat.    Cardiovascular: Negative for chest pain.   Respiratory: Negative for cough and shortness of breath.    Skin: Negative for itching and rash.   Musculoskeletal: Negative for arthralgias, joint pain, joint swelling, myalgias and neck pain.   Gastrointestinal: Negative for abdominal pain, anorexia, change in bowel habit, nausea and vomiting.   Neurological: Negative for headaches, numbness and vertigo.       Objective:      Physical Exam   Constitutional: She appears well-developed and well-nourished.   Skin:   2x4 cm oval pigmented sl raised well demarcated skin lesion R upper back   Nursing note and vitals reviewed.      Assessment:       1. Benign pigmented mole        Plan:         Benign pigmented mole          "

## 2019-01-30 ENCOUNTER — TELEPHONE (OUTPATIENT)
Dept: HEMATOLOGY/ONCOLOGY | Facility: CLINIC | Age: 69
End: 2019-01-30

## 2019-01-30 NOTE — TELEPHONE ENCOUNTER
Informed pt insurance paperwork is ready to be picked up.     ----- Message from Ghislaine Sebastian sent at 1/30/2019  3:35 PM CST -----  Contact: [phone]/ self  Patient called in returning your call. Please advise.

## 2023-04-07 ENCOUNTER — OFFICE VISIT (OUTPATIENT)
Dept: URGENT CARE | Facility: CLINIC | Age: 73
End: 2023-04-07
Payer: MEDICARE

## 2023-04-07 VITALS
RESPIRATION RATE: 16 BRPM | HEART RATE: 103 BPM | OXYGEN SATURATION: 96 % | TEMPERATURE: 98 F | SYSTOLIC BLOOD PRESSURE: 194 MMHG | DIASTOLIC BLOOD PRESSURE: 103 MMHG | BODY MASS INDEX: 29.08 KG/M2 | WEIGHT: 158.06 LBS | HEIGHT: 62 IN

## 2023-04-07 DIAGNOSIS — E03.9 HYPOTHYROIDISM, UNSPECIFIED TYPE: ICD-10-CM

## 2023-04-07 DIAGNOSIS — R01.1 HEART MURMUR: ICD-10-CM

## 2023-04-07 DIAGNOSIS — M79.675 TOE PAIN, LEFT: ICD-10-CM

## 2023-04-07 DIAGNOSIS — R60.0 PERIPHERAL EDEMA: ICD-10-CM

## 2023-04-07 DIAGNOSIS — I10 HYPERTENSION, UNSPECIFIED TYPE: Primary | ICD-10-CM

## 2023-04-07 PROBLEM — F41.1 GENERALIZED ANXIETY DISORDER: Status: ACTIVE | Noted: 2023-04-07

## 2023-04-07 LAB
BILIRUB UR QL STRIP: NEGATIVE
GLUCOSE UR QL STRIP: NEGATIVE
KETONES UR QL STRIP: NEGATIVE
LEUKOCYTE ESTERASE UR QL STRIP: NEGATIVE
PH, POC UA: 6.5
POC BLOOD, URINE: NEGATIVE
POC NITRATES, URINE: NEGATIVE
PROT UR QL STRIP: NEGATIVE
SP GR UR STRIP: 1.01 (ref 1–1.03)
UROBILINOGEN UR STRIP-ACNC: NORMAL (ref 0.1–1.1)

## 2023-04-07 PROCEDURE — 99204 PR OFFICE/OUTPT VISIT, NEW, LEVL IV, 45-59 MIN: ICD-10-PCS | Mod: S$GLB,,, | Performed by: NURSE PRACTITIONER

## 2023-04-07 PROCEDURE — 81003 URINALYSIS AUTO W/O SCOPE: CPT | Mod: QW,S$GLB,, | Performed by: NURSE PRACTITIONER

## 2023-04-07 PROCEDURE — 81003 POCT URINALYSIS, DIPSTICK, AUTOMATED, W/O SCOPE: ICD-10-PCS | Mod: QW,S$GLB,, | Performed by: NURSE PRACTITIONER

## 2023-04-07 PROCEDURE — 99204 OFFICE O/P NEW MOD 45 MIN: CPT | Mod: S$GLB,,, | Performed by: NURSE PRACTITIONER

## 2023-04-07 RX ORDER — AMLODIPINE BESYLATE 5 MG/1
5 TABLET ORAL DAILY
Qty: 30 TABLET | Refills: 0 | Status: SHIPPED | OUTPATIENT
Start: 2023-04-07 | End: 2023-04-08

## 2023-04-08 NOTE — PATIENT INSTRUCTIONS
Please seek Primary Care. A referal has been placed.   You will need labwork and a further workup for chronic conditions. You will also need them to refill your blood pressure medications; and reassess your thyroid levels.    Follow up with endocrine    Monitor daily Blood Pressures (same time every day)     Elevate your feet

## 2023-04-08 NOTE — PROGRESS NOTES
"Subjective:      Patient ID: Hillary Lane is a 73 y.o. female.    Vitals:  height is 5' 2.21" (1.58 m) and weight is 71.7 kg (158 lb 1.1 oz). Her tympanic temperature is 98.4 °F (36.9 °C). Her blood pressure is 194/103 (abnormal) and her pulse is 103. Her respiration is 16 and oxygen saturation is 96%.     Chief Complaint: Thyroid Problem    72 y/o female presents to  today with c/o decreased temp of 97.4 at home, on a few occasions. She has had h/o what she explained to be hyperthyroidism, then hypothyroidism after partial removal of thyroid. She is currently taking synthroid-however an old dose, so has not been on it consistently. She has also noted high blood pressure at home, up to 190s systolic. She takes a diuretic as needed for the high blood pressure. She reports cold intolerance, with the decreased temperature. Denies fever and illness. Reports swelling to feet, that she states has been that way for years-since she was in her 20s. Reports an old injury to her left toes, that still gives her prolonged trouble. Denies chest pain and dizziness. Denies n/v/d. Reports a h/o osteoporosis, in which her height is shrinking. Reports positive exposure to dust/mold/allergens. Denies h/o murmur, noted on exam today.       Thyroid Problem  Presents for initial visit. Symptoms include cold intolerance and fatigue. Patient reports no diarrhea or palpitations. (Chills ) The symptoms have been stable. The treatment provided mild relief.   Constitution: Positive for fatigue. Negative for fever.   HENT:  Negative for congestion.    Cardiovascular:  Positive for leg swelling. Negative for chest pain, palpitations and sob on exertion.   Respiratory:  Negative for chest tightness, cough and wheezing.    Gastrointestinal:  Negative for nausea, vomiting and diarrhea.   Endocrine: cold intolerance.   Genitourinary:  Negative for dysuria.   Musculoskeletal:  Positive for pain.    Objective:     Physical Exam   Constitutional: " She is oriented to person, place, and time.   HENT:   Head: Normocephalic.   Nose: Nose normal.   Mouth/Throat: Mucous membranes are moist. Oropharynx is clear.   Eyes: Right eye exhibits no discharge. Left eye exhibits no discharge.   Neck: Neck supple. No neck rigidity present.   Cardiovascular: Normal rate and regular rhythm.   Murmur heard.  Pulmonary/Chest: Effort normal and breath sounds normal. She has no wheezes. She has no rhonchi.   Abdominal: Normal appearance.   Musculoskeletal: Normal range of motion.         General: Normal range of motion.      Right lower leg: Edema (to feet and ankles) present.      Left lower leg: Edema (to feet and ankles) present.      Comments: Left toes with no acute process.    Neurological: She is alert and oriented to person, place, and time.   Skin: Skin is warm and dry. Capillary refill takes less than 2 seconds.   Psychiatric: Her behavior is normal. Mood normal.   Nursing note and vitals reviewed.    Assessment:     1. Hypertension, unspecified type    2. Hypothyroidism, unspecified type    3. Toe pain, left    4. Heart murmur    5. Peripheral edema      Active Ambulatory Problems     Diagnosis Date Noted    Hypothyroidism     Raynaud phenomenon     Other and unspecified hyperlipidemia     Depression     History of venomous spider bite     Chronic tension headaches 11/01/2012    Cervicogenic headache 03/06/2015    Hypertension 04/07/2023    Generalized anxiety disorder 04/07/2023     Resolved Ambulatory Problems     Diagnosis Date Noted    No Resolved Ambulatory Problems     No Additional Past Medical History      Plan:       Hypertension, unspecified type  -     POCT Urinalysis, Dipstick, Automated, W/O Scope  -     Ambulatory referral/consult to Internal Medicine  -     amLODIPine (NORVASC) 5 MG tablet; Take 1 tablet (5 mg total) by mouth once daily.  Dispense: 30 tablet; Refill: 0    Hypothyroidism, unspecified type  -     Ambulatory referral/consult to  Endocrinology  -     Ambulatory referral/consult to Internal Medicine    Toe pain, left  -     Ambulatory referral/consult to Internal Medicine    Heart murmur  -     Ambulatory referral/consult to Internal Medicine    Peripheral edema  -     Ambulatory referral/consult to Internal Medicine      Patient Instructions   Please seek Primary Care. A referal has been placed.   You will need labwork and a further workup for chronic conditions. You will also need them to refill your blood pressure medications; and reassess your thyroid levels.    Follow up with endocrine    Monitor daily Blood Pressures (same time every day)     Elevate your feet         Under orders, it appears as if amlodipine was discontinued, however, I called and spoke to pharmacist. Medication was already given to the patient and not discontinued.

## 2024-01-31 ENCOUNTER — OFFICE VISIT (OUTPATIENT)
Dept: URGENT CARE | Facility: CLINIC | Age: 74
End: 2024-01-31
Payer: MEDICARE

## 2024-01-31 VITALS
WEIGHT: 158 LBS | RESPIRATION RATE: 18 BRPM | OXYGEN SATURATION: 98 % | DIASTOLIC BLOOD PRESSURE: 100 MMHG | TEMPERATURE: 98 F | BODY MASS INDEX: 29.08 KG/M2 | HEIGHT: 62 IN | HEART RATE: 87 BPM | SYSTOLIC BLOOD PRESSURE: 190 MMHG

## 2024-01-31 DIAGNOSIS — T30.0: Primary | ICD-10-CM
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
## 2024-01-31 DIAGNOSIS — I16.0 ASYMPTOMATIC HYPERTENSIVE URGENCY: ICD-10-CM

## 2024-01-31 PROCEDURE — 99214 OFFICE O/P EST MOD 30 MIN: CPT | Mod: S$GLB,,,

## 2024-01-31 RX ORDER — MUPIROCIN 20 MG/G
OINTMENT TOPICAL 3 TIMES DAILY
Qty: 30 G | Refills: 0 | Status: SHIPPED | OUTPATIENT
Start: 2024-01-31
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
## 2024-02-01 NOTE — PATIENT INSTRUCTIONS
Please apply antibiotic ointment as prescribed.  Keep wound clean, dry, and covered.  Do not pick at the area or touch with dirty fingers/substances.  Always change dressing with clean gauze.  You may develop some itching to the area, this is due to new skin growth.  If the wound does not improve or you start to have drainage, increased pain or redness, please return to urgent care.      YOUR BLOOD PRESSURE IS ELEVATED TODAY IN CLINIC.  Please take your BP medication as prescribed.  Uncontrolled blood pressure can result in life-threatening consequences, such as heart attack or stroke.  Please follow up with your primary care doctor for better BP management.  If you develop chest pain, palpitations, vision changes, extremity weakness/numbness, severe headache, GO TO THE EMERGENCY ROOM.